# Patient Record
Sex: FEMALE | Race: WHITE | URBAN - METROPOLITAN AREA
[De-identification: names, ages, dates, MRNs, and addresses within clinical notes are randomized per-mention and may not be internally consistent; named-entity substitution may affect disease eponyms.]

---

## 2017-02-24 ENCOUNTER — TRANSFERRED RECORDS (OUTPATIENT)
Dept: HEALTH INFORMATION MANAGEMENT | Facility: CLINIC | Age: 57
End: 2017-02-24

## 2017-06-30 ENCOUNTER — TRANSFERRED RECORDS (OUTPATIENT)
Dept: HEALTH INFORMATION MANAGEMENT | Facility: CLINIC | Age: 57
End: 2017-06-30

## 2017-07-04 ENCOUNTER — HOSPITAL ENCOUNTER (EMERGENCY)
Facility: CLINIC | Age: 57
Discharge: HOME | End: 2017-07-04
Attending: FAMILY MEDICINE | Admitting: FAMILY MEDICINE
Payer: COMMERCIAL

## 2017-07-04 VITALS
OXYGEN SATURATION: 97 % | DIASTOLIC BLOOD PRESSURE: 55 MMHG | TEMPERATURE: 97.9 F | RESPIRATION RATE: 18 BRPM | SYSTOLIC BLOOD PRESSURE: 121 MMHG | WEIGHT: 230 LBS

## 2017-07-04 DIAGNOSIS — R10.13 EPIGASTRIC PAIN: ICD-10-CM

## 2017-07-04 DIAGNOSIS — E46 PROTEIN DEFICIENCY (H): ICD-10-CM

## 2017-07-04 DIAGNOSIS — R91.8 MASS OF LEFT LUNG: ICD-10-CM

## 2017-07-04 LAB
ALBUMIN SERPL-MCNC: 2.7 G/DL (ref 3.4–5)
ALBUMIN UR-MCNC: NEGATIVE MG/DL
ALP SERPL-CCNC: 104 U/L (ref 40–150)
ALT SERPL W P-5'-P-CCNC: 32 U/L (ref 0–50)
AMORPH CRY #/AREA URNS HPF: ABNORMAL /HPF
ANION GAP SERPL CALCULATED.3IONS-SCNC: 8 MMOL/L (ref 3–14)
APPEARANCE UR: ABNORMAL
AST SERPL W P-5'-P-CCNC: 23 U/L (ref 0–45)
BASOPHILS # BLD AUTO: 0 10E9/L (ref 0–0.2)
BASOPHILS NFR BLD AUTO: 0.4 %
BILIRUB SERPL-MCNC: 0.3 MG/DL (ref 0.2–1.3)
BILIRUB UR QL STRIP: NEGATIVE
BUN SERPL-MCNC: 23 MG/DL (ref 7–30)
CALCIUM SERPL-MCNC: 8.4 MG/DL (ref 8.5–10.1)
CAOX CRY #/AREA URNS HPF: ABNORMAL /HPF
CHLORIDE SERPL-SCNC: 110 MMOL/L (ref 94–109)
CO2 SERPL-SCNC: 26 MMOL/L (ref 20–32)
COLOR UR AUTO: YELLOW
CREAT SERPL-MCNC: 1.02 MG/DL (ref 0.52–1.04)
D DIMER PPP FEU-MCNC: 0.3 UG/ML FEU (ref 0–0.5)
DIFFERENTIAL METHOD BLD: NORMAL
EOSINOPHIL # BLD AUTO: 0.2 10E9/L (ref 0–0.7)
EOSINOPHIL NFR BLD AUTO: 2.1 %
ERYTHROCYTE [DISTWIDTH] IN BLOOD BY AUTOMATED COUNT: 14.3 % (ref 10–15)
GFR SERPL CREATININE-BSD FRML MDRD: 56 ML/MIN/1.7M2
GLUCOSE SERPL-MCNC: 133 MG/DL (ref 70–99)
GLUCOSE UR STRIP-MCNC: NEGATIVE MG/DL
HCT VFR BLD AUTO: 41.8 % (ref 35–47)
HGB BLD-MCNC: 13.4 G/DL (ref 11.7–15.7)
HGB UR QL STRIP: NEGATIVE
IMM GRANULOCYTES # BLD: 0.1 10E9/L (ref 0–0.4)
IMM GRANULOCYTES NFR BLD: 0.9 %
KETONES UR STRIP-MCNC: NEGATIVE MG/DL
LEUKOCYTE ESTERASE UR QL STRIP: NEGATIVE
LIPASE SERPL-CCNC: 295 U/L (ref 73–393)
LYMPHOCYTES # BLD AUTO: 1.9 10E9/L (ref 0.8–5.3)
LYMPHOCYTES NFR BLD AUTO: 18.1 %
MCH RBC QN AUTO: 27.6 PG (ref 26.5–33)
MCHC RBC AUTO-ENTMCNC: 32.1 G/DL (ref 31.5–36.5)
MCV RBC AUTO: 86 FL (ref 78–100)
MONOCYTES # BLD AUTO: 0.7 10E9/L (ref 0–1.3)
MONOCYTES NFR BLD AUTO: 6.9 %
MUCOUS THREADS #/AREA URNS LPF: PRESENT /LPF
NEUTROPHILS # BLD AUTO: 7.3 10E9/L (ref 1.6–8.3)
NEUTROPHILS NFR BLD AUTO: 71.6 %
NITRATE UR QL: NEGATIVE
PH UR STRIP: 5.5 PH (ref 5–7)
PLATELET # BLD AUTO: 251 10E9/L (ref 150–450)
POTASSIUM SERPL-SCNC: 3.7 MMOL/L (ref 3.4–5.3)
PROT SERPL-MCNC: 6.2 G/DL (ref 6.8–8.8)
RBC # BLD AUTO: 4.86 10E12/L (ref 3.8–5.2)
RBC #/AREA URNS AUTO: 27 /HPF (ref 0–2)
SODIUM SERPL-SCNC: 144 MMOL/L (ref 133–144)
SP GR UR STRIP: 1.02 (ref 1–1.03)
SQUAMOUS #/AREA URNS AUTO: 3 /HPF (ref 0–1)
TROPONIN I SERPL-MCNC: NORMAL UG/L (ref 0–0.04)
URN SPEC COLLECT METH UR: ABNORMAL
UROBILINOGEN UR STRIP-MCNC: NORMAL MG/DL (ref 0–2)
WBC # BLD AUTO: 10.2 10E9/L (ref 4–11)
WBC #/AREA URNS AUTO: 0 /HPF (ref 0–2)

## 2017-07-04 PROCEDURE — 80053 COMPREHEN METABOLIC PANEL: CPT | Performed by: FAMILY MEDICINE

## 2017-07-04 PROCEDURE — 25000131 ZZH RX MED GY IP 250 OP 636 PS 637: Performed by: FAMILY MEDICINE

## 2017-07-04 PROCEDURE — 25000132 ZZH RX MED GY IP 250 OP 250 PS 637: Performed by: FAMILY MEDICINE

## 2017-07-04 PROCEDURE — 99284 EMERGENCY DEPT VISIT MOD MDM: CPT

## 2017-07-04 PROCEDURE — 84484 ASSAY OF TROPONIN QUANT: CPT | Performed by: FAMILY MEDICINE

## 2017-07-04 PROCEDURE — 83690 ASSAY OF LIPASE: CPT | Performed by: FAMILY MEDICINE

## 2017-07-04 PROCEDURE — 85025 COMPLETE CBC W/AUTO DIFF WBC: CPT | Performed by: FAMILY MEDICINE

## 2017-07-04 PROCEDURE — 81001 URINALYSIS AUTO W/SCOPE: CPT | Performed by: FAMILY MEDICINE

## 2017-07-04 PROCEDURE — 99284 EMERGENCY DEPT VISIT MOD MDM: CPT | Performed by: FAMILY MEDICINE

## 2017-07-04 PROCEDURE — 36415 COLL VENOUS BLD VENIPUNCTURE: CPT

## 2017-07-04 PROCEDURE — 93005 ELECTROCARDIOGRAM TRACING: CPT

## 2017-07-04 PROCEDURE — 85379 FIBRIN DEGRADATION QUANT: CPT | Performed by: FAMILY MEDICINE

## 2017-07-04 RX ORDER — SUCRALFATE ORAL 1 G/10ML
1 SUSPENSION ORAL
Status: DISCONTINUED | OUTPATIENT
Start: 2017-07-04 | End: 2017-07-04 | Stop reason: HOSPADM

## 2017-07-04 RX ORDER — METOCLOPRAMIDE 10 MG/1
10 TABLET ORAL 4 TIMES DAILY PRN
Qty: 10 TABLET | Refills: 0 | Status: SHIPPED | OUTPATIENT
Start: 2017-07-04

## 2017-07-04 RX ORDER — METOCLOPRAMIDE 10 MG/1
10 TABLET ORAL
Status: DISCONTINUED | OUTPATIENT
Start: 2017-07-04 | End: 2017-07-04 | Stop reason: HOSPADM

## 2017-07-04 RX ADMIN — METOCLOPRAMIDE HYDROCHLORIDE 10 MG: 10 TABLET ORAL at 18:12

## 2017-07-04 RX ADMIN — SUCRALFATE 1 G: 1 SUSPENSION ORAL at 18:01

## 2017-07-04 ASSESSMENT — ENCOUNTER SYMPTOMS
BLOOD IN STOOL: 0
WHEEZING: 0
FEVER: 0
CONSTIPATION: 0
DYSURIA: 0
VOMITING: 0
NAUSEA: 0
DIAPHORESIS: 0
PALPITATIONS: 0
SORE THROAT: 0
HEADACHES: 0
ABDOMINAL PAIN: 1
CHILLS: 0
DIARRHEA: 0
SHORTNESS OF BREATH: 1
FREQUENCY: 0
COUGH: 1
SINUS PRESSURE: 0

## 2017-07-04 NOTE — ED NOTES
Pt has had a hiatal hernia for the last 3 years. States that she has had more pain lately and was seen at her PCP about 3 weeks ago for it and MD increased her protonix. Pain is in the left upper abdomen and wraps around to her back. Since supper last night she has been having more pain and her meds aren't working. Pt states that this is the same pain that she always has with her hernia. Has been worked up cardiac wise with this pain before.

## 2017-07-04 NOTE — ED PROVIDER NOTES
History     Chief Complaint   Patient presents with     Abdominal Pain     states she is having a hatal hernia attach and GI cocktail wont work needs morphine, this pain started last pm, has tried everything she could at home with no hlep      HPI  Sidra Morris is a 56 year old female who history hiatal hernia with intermittent GERD exacerbations, and - for the last 3 weeks has had increasing epigastric pain.    last evening following sausage - had LUQ pain and intermittently radiates around to the left upper back. Can also be on the right.    The patient denies nausea or vomiting, dysphagia, change in bowel habits or black or bloody stools or weight loss. Occult stool testing negative this year.  colonoscopy not yet done.  EGD done last 3 years ago and demonstrated only hiatal hernia. manometry and pH done. also had some stenosis findings.  longterm eructation and GERD.    Nausea today.  No vomiting.  s/p william 8 years ago.      traveling from [place] 3+ hours.  No other VTE risks including no estrogen    PMH  Congenital defect with absent RT uterus, ovary  history of ectopic  s/p hysterectomy   x2    Congenitally absent rt kidney    quit tobacco 1.5 years ago  COPD - emphysema    no alcohol  No drugs    No diabetes  hyperlipidemia on simvastatin      chest xray in the last week was normal - after cough with blood  CT chest at [facility] was negative - as far as she knows    I have reviewed the Medications, Allergies, Past Medical and Surgical History, and Social History in the Epic system.         Review of Systems   Constitutional: Negative for chills, diaphoresis and fever.   HENT: Negative for ear pain, sinus pressure and sore throat.    Eyes: Negative for visual disturbance.   Respiratory: Positive for cough and shortness of breath. Negative for wheezing.    Cardiovascular: Negative for chest pain and palpitations.   Gastrointestinal: Positive for abdominal pain. Negative for blood in stool, constipation,  diarrhea, nausea and vomiting.   Genitourinary: Negative for dysuria, frequency and urgency.   Skin: Negative for rash.   Neurological: Negative for headaches.   All other systems reviewed and are negative.      Physical Exam   BP: 117/72  Heart Rate: 78  Temp: 97.9  F (36.6  C)  Resp: 18  Weight: 104.3 kg (230 lb)  SpO2: 96 %  Physical Exam   Constitutional: No distress.   HENT:   Mouth/Throat: Oropharynx is clear and moist.   Eyes: Conjunctivae are normal.   Neck: Neck supple.   Cardiovascular: Normal rate and regular rhythm.  Exam reveals no gallop and no friction rub.    No murmur heard.  Pulmonary/Chest: Effort normal and breath sounds normal. No stridor. No respiratory distress. She has no wheezes. She has no rales.   Abdominal: Soft. She exhibits distension. There is no tenderness. There is no rebound and no guarding.   Musculoskeletal: She exhibits no edema.   Neurological: She is alert.   Skin: No rash noted. She is not diaphoretic.       ED Course     ED Course     Procedures            EKG: EKG done at 1805 hrs. demonstrates a sinus rhythm at 80 bpm with a normal axis and no ST change.  T-wave inversion in leads V1 and V2 with biphasic in V3 and flattening in V4 as well as in lead 3.  poor R progression V1 through V4 no Q waves.  Normal conduction.  No ectopy.  Normal intervals.  Impression sinus rhythm 80 bpm T-wave inversions and poor R progression in precordial leads.  No old EKG to compare.    Critical Care time:  none               Results for orders placed or performed during the hospital encounter of 07/04/17   CBC with platelets differential   Result Value Ref Range    WBC 10.2 4.0 - 11.0 10e9/L    RBC Count 4.86 3.8 - 5.2 10e12/L    Hemoglobin 13.4 11.7 - 15.7 g/dL    Hematocrit 41.8 35.0 - 47.0 %    MCV 86 78 - 100 fl    MCH 27.6 26.5 - 33.0 pg    MCHC 32.1 31.5 - 36.5 g/dL    RDW 14.3 10.0 - 15.0 %    Platelet Count 251 150 - 450 10e9/L    Diff Method Automated Method     % Neutrophils 71.6 %     % Lymphocytes 18.1 %    % Monocytes 6.9 %    % Eosinophils 2.1 %    % Basophils 0.4 %    % Immature Granulocytes 0.9 %    Absolute Neutrophil 7.3 1.6 - 8.3 10e9/L    Absolute Lymphocytes 1.9 0.8 - 5.3 10e9/L    Absolute Monocytes 0.7 0.0 - 1.3 10e9/L    Absolute Eosinophils 0.2 0.0 - 0.7 10e9/L    Absolute Basophils 0.0 0.0 - 0.2 10e9/L    Abs Immature Granulocytes 0.1 0 - 0.4 10e9/L   Comprehensive metabolic panel   Result Value Ref Range    Sodium 144 133 - 144 mmol/L    Potassium 3.7 3.4 - 5.3 mmol/L    Chloride 110 (H) 94 - 109 mmol/L    Carbon Dioxide 26 20 - 32 mmol/L    Anion Gap 8 3 - 14 mmol/L    Glucose 133 (H) 70 - 99 mg/dL    Urea Nitrogen 23 7 - 30 mg/dL    Creatinine 1.02 0.52 - 1.04 mg/dL    GFR Estimate 56 (L) >60 mL/min/1.7m2    GFR Estimate If Black 67 >60 mL/min/1.7m2    Calcium 8.4 (L) 8.5 - 10.1 mg/dL    Bilirubin Total 0.3 0.2 - 1.3 mg/dL    Albumin 2.7 (L) 3.4 - 5.0 g/dL    Protein Total 6.2 (L) 6.8 - 8.8 g/dL    Alkaline Phosphatase 104 40 - 150 U/L    ALT 32 0 - 50 U/L    AST 23 0 - 45 U/L   Lipase   Result Value Ref Range    Lipase 295 73 - 393 U/L   Troponin I   Result Value Ref Range    Troponin I ES  0.000 - 0.045 ug/L     <0.015  The 99th percentile for upper reference range is 0.045 ug/L.  Troponin values in   the range of 0.045 - 0.120 ug/L may be associated with risks of adverse   clinical events.     D dimer quantitative   Result Value Ref Range    D Dimer 0.3 0.0 - 0.50 ug/ml FEU   UA with Microscopic   Result Value Ref Range    Color Urine Yellow     Appearance Urine Slightly Cloudy     Glucose Urine Negative NEG mg/dL    Bilirubin Urine Negative NEG    Ketones Urine Negative NEG mg/dL    Specific Gravity Urine 1.025 1.003 - 1.035    Blood Urine Negative NEG    pH Urine 5.5 5.0 - 7.0 pH    Protein Albumin Urine Negative NEG mg/dL    Urobilinogen mg/dL Normal 0.0 - 2.0 mg/dL    Nitrite Urine Negative NEG    Leukocyte Esterase Urine Negative NEG    Source Midstream Urine     WBC  "Urine 0 0 - 2 /HPF    RBC Urine 27 (H) 0 - 2 /HPF    Squamous Epithelial /HPF Urine 3 (H) 0 - 1 /HPF    Mucous Urine Present (A) NEG /LPF    Calcium Oxalate Few (A) NEG /HPF    Amorphous Crystals Few (A) NEG /HPF         Assessments & Plan (with Medical Decision Making)     MDM: Sidra Morris is a 56 year old female Who presented with a history of gastrointestinal symptoms and multiple G.I. workups over time including [facility] with upper endoscopy, manometry.  She was here for a dyspepsia type of sensation, Triggered by which he attributed as dietary indiscretions the night before and symptoms it started soon after that. but that was radiating into the left upper quadrant.  Refractory to her antacids - and even exacerbated by antacids.  She noted should been treated with opioids for this type of G.I. pain and refractory to other measures.  She noted it was similar to prior episodes. In the emergency department  she did have relief with Reglan and we discussed its use outpatient on a PRN basis.    However we also consider differential diagnosis this was in left upper quadrant and could reflect left lower chest symptoms.  We had no history from the patient who is traveling from [place], And it also had an evaluation at [facility] in the last week for one episode of hemoptysis with the CT of the chest that had been obtained but results were not available to her. She had not heard from them so is assuming that this was normal.  [facility]\" did not have her facilities from [place] but I was able to request records from [facility].  These did not arrive until after her discharge but before she left her exam room And are discussed below.   As she just had a chest x-ray and CT of the chest in the last week she did not want to pursue any chest imaging but was agreeable to troponin d-dimer EKG and lab work related to her gastrointestinal symptoms.  These findings were reassuring - although she had findings on EKG " that might have been prior anterior MI - no acute changes were present.  She did curiously have a low albumin and protein and we discussed this as possible malnutrition - but is more likely related to CT findings as below.    As noted, as she was being readied for discharge, [facility] CT results arrived.  The CT chest demonstrates a 5 cm mass with hilar adenopathy and highly suggestive of malignancy.  It is unclear if this is causing her current symptoms - This could be a referred pain to LU.  she currently has no shortness of breath or chest pain, cough. normal vitals and not hypoxic.  I expressed to Yelena that I was suspicious that these findings were consistent with cancer and this needed to be biopsied and she would need to call [facility] in the morning and speak with her pulmonology group..  She is also given precautions for return for pulmonary symptoms.          I have reviewed the nursing notes.    I have reviewed the findings, diagnosis, plan and need for follow up with the patient.       New Prescriptions    No medications on file       Final diagnoses:   Epigastric pain - Unclear cause.  possible causes include gastritis, peptic ulcers, hiatal hernia/GERD, dyspepsia, delayed gastric emptying, constipation.  Trial on reglan as needed - keep benadryl around with this med in case of reaction.  Stay on prilosec.  Consider B12 test (although MCV normal).  consider bowel regimen - starting wit fleets enema x1, followed by mag citrate x1 bottle, followed by miralax 1/2 to 1 capful for 1 week, followed by citrucel or other fibver.   Protein deficiency (H) - protein and albumin low. ?malnourishment   Mass of left lung - There is a mass in the left lung 5 cm that was seen on Thursday's CT at [facility]. This is suspicious for cancer.  This was reviewed at the end of the visit. The chest xray was not repeated today given the recent CT, but if shortness of breath or chest pain occur, return for repeat chest xray/  This may  not be related to today's symptoms.       7/4/2017   [facility] EMERGENCY DEPARTMENT     José Miguel Jensen MD  07/05/17 0927

## 2017-07-04 NOTE — ED AVS SNAPSHOT
[facility] Emergency Department    [address] [facility] [address]    Phone:  [phone]    Fax:  [phone]                                       Sidra Morris   MRN: [id number]    Department:  [facility] Emergency Department   Date of Visit:  7/4/2017           Patient Information     Date Of Birth          1960        Your diagnoses for this visit were:     Epigastric pain Unclear cause.  possible causes include gastritis, peptic ulcers, hiatal hernia/GERD, dyspepsia, delayed gastric emptying, constipation.  Trial on reglan as needed - keep benadryl around with this med in case of reaction.  Stay on prilosec.  Consider B12 test (although MCV normal).  consider bowel regimen - starting wit fleets enema x1, followed by mag citrate x1 bottle, followed by miralax 1/2 to 1 capful for 1 week, followed by citrucel or other fibver.    Protein deficiency (H) protein and albumin low. ?malnourishment    Mass of left lung There is a mass in the left lung 5 cm that was seen on Thursday's CT at [place]. This is suspicious for cancer.  This was reviewed at the end of the visit. The chest xray was not repeated today given the recent CT, but if shortness of breath or chest pain occur, return for repeat chest xray/  This may not be related to today's symptoms.       You were seen by José Miguel Jensen MD.      Follow-up Information     Follow up with Manjula Benito In 1 week.    Contact information:    [id number]  [address] [place] [address]          Follow up with [facility] Emergency Department.    Specialty:  EMERGENCY MEDICINE    Why:  As needed, If symptoms worsen    Contact information:    [address] [place] [id number] [phone]    Additional information:    The medical center is located at   [address] (between [address] and   [address] in [place], [place] north   of [place]).        Discharge Instructions         ICD-10-CM    1.  Epigastric pain R10.13     Unclear cause.  possible causes include gastritis, peptic ulcers, hiatal hernia/GERD, dyspepsia, delayed gastric emptying, constipation.  Trial on reglan as needed - keep benadryl around with this med in case of reaction.  Stay on prilosec.  Consider B12 test (although MCV normal).  consider bowel regimen - starting wit fleets enema x1, followed by mag citrate x1 bottle, followed by miralax 1/2 to 1 capful for 1 week, followed by citrucel or other fibver.   2. Protein deficiency (H) E46     protein and albumin low. ?malnourishment   3. Mass of left lung R91.8     There is a mass in the left lung 5 cm that was seen on Thursday's CT at [place]. This is suspicious for cancer.  This was reviewed at the end of the visit. The chest xray was not repeated today given the recent CT, but if shortness of breath or chest pain occur, return for repeat chest xray/  This may not be related to today's symptoms.         Epigastric Pain (Uncertain Cause)    Epigastric pain can be a sign of disease in the upper abdomen. Common causes include:    Acid reflux (stomach acid flowing up into the esophagus)    Gastritis (irritation of the stomach lining)    Peptic Ulcer Disease    Inflammation of the pancreas    Gallstone    Infection in the gallbladder  Pain may be dull or burning. It may spread upward to the chest or to the back. There may be other symptoms such as belching, bloating, cramps or hunger pains. There may be weight loss or poor appetite, nausea or vomiting.  Since the diagnosis of your pain is not certain yet, further tests may sometimes be needed. Sometimes the doctor will treat you for the most likely condition to see if there is improvement before doing further tests.  Home care  Medicines    Antacids help neutralize the normal acids in your stomach. Examples are Maalox, Mylanta, Rolaids, and Tums. If you don t like the liquid, you can also try a chewable one. You may find one works better than  another for you. Overuse can cause diarrhea or constipation.    Acid blockers (H2 blockers) decrease acid production. Examples are cimetidine (Tagamet), famotidine (Pepcid) and ranitidine (Zantac).    Acid inhibitors (PPIs) decrease acid production in a different way than the blockers. You may find they work better, but can take a little longer to take effect.  Examples are omeprazole (Prilosec), lansoprazole (Prevacid), pantoprazole (Protonix), rabeprazole (Aciphex), and esomeprazole (Nexium).    Take an antacid 30-60 minutes after eating and at bedtime, but not at the same time as an acid blocker.    Try not to take NSAIDs. Aspirin may also cause problems, but if taking it for your heart or other medical reasons, talk to your doctor before stopping it; you do not want to cause a worse problem, like a heart attack or stroke.  Diet    If certain foods seem to cause your spasm, try to avoid them.     Eat slowly and chew food well before swallowing. Symptoms of gastritis can be worsened by certain foods. Limit or avoid fatty, fried, and spicy foods, as well as coffee, chocolate, mint, and foods with high acid content such as tomatoes and citrus fruit and juices (orange, grapefruit, lemon).    Avoid alcohol, caffeine, and tobacco, which can delay healing and worsen your problem.    Try eating smaller meals with snacks in between  Follow-up care  Follow up with your healthcare provider or as advised.  When to seek medical advice  Call your healthcare provider right away if any of the following occur:    Stomach pain worsens or moves to the right lower part of the abdomen    Chest pain appears, or if it worsens or spreads to the chest, back, neck, shoulder, or arm    Frequent vomiting (can t keep down liquids)    Blood in the stool or vomit (red or black color)    Feeling weak or dizzy, fainting, or having trouble breathing    Fever of 100.4 F (38 C) or higher, or as directed by your healthcare provider    Abdominal  swelling  Date Last Reviewed: 9/25/2015 2000-2017 [employer]. [address] [place] [address], [place] [address]. All rights reserved. This information is not intended as a substitute for professional medical care. Always follow your healthcare professional's instructions.          24 Hour Appointment Hotline       To make an appointment at any [facility], call [phone] ([phone]). If you don't have a family doctor or clinic, we will help you find one. [facility] are conveniently located to serve the needs of you and your family.             Review of your medicines      START taking        Dose / Directions Last dose taken    metoclopramide 10 MG tablet   Commonly known as:  REGLAN   Dose:  10 mg   Quantity:  10 tablet        Take 1 tablet (10 mg) by mouth 4 times daily as needed   Refills:  0          Our records show that you are taking the medicines listed below. If these are incorrect, please call your family doctor or clinic.        Dose / Directions Last dose taken    PANTOPRAZOLE SODIUM PO        Take by mouth 2 times daily (before meals)   Refills:  0        SIMVASTATIN PO        Take by mouth daily   Refills:  0        VENLAFAXINE HCL ER PO        Take by mouth daily   Refills:  0        ZANTAC PO        Take by mouth daily   Refills:  0                Prescriptions were sent or printed at these locations (1 Prescription)                   [facility] [address] [address]    Telephone:  [phone]   Fax:  [phone]   Hours:                  E-Prescribed (1 of 1)         metoclopramide (REGLAN) 10 MG tablet                Procedures and tests performed during your visit     CBC with platelets differential    Comprehensive metabolic panel    D dimer quantitative    EKG 12-lead, tracing only    Lipase    Troponin I    UA with Microscopic      Orders Needing Specimen Collection     None      Pending Results      No orders found from 7/2/2017 to 7/5/2017.            Pending Culture Results     No orders found from 7/2/2017 to 7/5/2017.            Pending Results Instructions     If you had any lab results that were not finalized at the time of your Discharge, you can call the ED Lab Result RN at [phone]. You will be contacted by this team for any positive Lab results or changes in treatment. The nurses are available 7 days a week from 10A to 6:30P.  You can leave a message 24 hours per day and they will return your call.        Test Results From Your Hospital Stay        7/4/2017  6:53 PM      Component Results     Component Value Ref Range & Units Status    WBC 10.2 4.0 - 11.0 10e9/L Final    RBC Count 4.86 3.8 - 5.2 10e12/L Final    Hemoglobin 13.4 11.7 - 15.7 g/dL Final    Hematocrit 41.8 35.0 - 47.0 % Final    MCV 86 78 - 100 fl Final    MCH 27.6 26.5 - 33.0 pg Final    MCHC 32.1 31.5 - 36.5 g/dL Final    RDW 14.3 10.0 - 15.0 % Final    Platelet Count 251 150 - 450 10e9/L Final    Diff Method Automated Method  Final    % Neutrophils 71.6 % Final    % Lymphocytes 18.1 % Final    % Monocytes 6.9 % Final    % Eosinophils 2.1 % Final    % Basophils 0.4 % Final    % Immature Granulocytes 0.9 % Final    Absolute Neutrophil 7.3 1.6 - 8.3 10e9/L Final    Absolute Lymphocytes 1.9 0.8 - 5.3 10e9/L Final    Absolute Monocytes 0.7 0.0 - 1.3 10e9/L Final    Absolute Eosinophils 0.2 0.0 - 0.7 10e9/L Final    Absolute Basophils 0.0 0.0 - 0.2 10e9/L Final    Abs Immature Granulocytes 0.1 0 - 0.4 10e9/L Final         7/4/2017  7:09 PM      Component Results     Component Value Ref Range & Units Status    Sodium 144 133 - 144 mmol/L Final    Potassium 3.7 3.4 - 5.3 mmol/L Final    Chloride 110 (H) 94 - 109 mmol/L Final    Carbon Dioxide 26 20 - 32 mmol/L Final    Anion Gap 8 3 - 14 mmol/L Final    Glucose 133 (H) 70 - 99 mg/dL Final    Urea Nitrogen 23 7 - 30 mg/dL Final    Creatinine 1.02 0.52 - 1.04 mg/dL Final    GFR Estimate  56 (L) >60 mL/min/1.7m2 Final    Non  GFR Calc    GFR Estimate If Black 67 >60 mL/min/1.7m2 Final    African American GFR Calc    Calcium 8.4 (L) 8.5 - 10.1 mg/dL Final    Bilirubin Total 0.3 0.2 - 1.3 mg/dL Final    Albumin 2.7 (L) 3.4 - 5.0 g/dL Final    Protein Total 6.2 (L) 6.8 - 8.8 g/dL Final    Alkaline Phosphatase 104 40 - 150 U/L Final    ALT 32 0 - 50 U/L Final    AST 23 0 - 45 U/L Final         7/4/2017  7:09 PM      Component Results     Component Value Ref Range & Units Status    Lipase 295 73 - 393 U/L Final         7/4/2017  7:09 PM      Component Results     Component Value Ref Range & Units Status    Troponin I ES  0.000 - 0.045 ug/L Final    <0.015  The 99th percentile for upper reference range is 0.045 ug/L.  Troponin values in   the range of 0.045 - 0.120 ug/L may be associated with risks of adverse   clinical events.           7/4/2017  7:00 PM      Component Results     Component Value Ref Range & Units Status    D Dimer 0.3 0.0 - 0.50 ug/ml FEU Final    This D-dimer assay is intended for use in conjuntion with a clinical pretest   probability assessment model to exclude pulmonary embolism (PE) and as an aid   in the diagnosis of deep venous thrombosis (DVT) in outpatients suspected of   PE   or DVT. The cut-off value is 0.5 g/mL FEU.           7/4/2017  7:20 PM      Component Results     Component Value Ref Range & Units Status    Color Urine Yellow  Final    Appearance Urine Slightly Cloudy  Final    Glucose Urine Negative NEG mg/dL Final    Bilirubin Urine Negative NEG Final    Ketones Urine Negative NEG mg/dL Final    Specific Gravity Urine 1.025 1.003 - 1.035 Final    Blood Urine Negative NEG Final    pH Urine 5.5 5.0 - 7.0 pH Final    Protein Albumin Urine Negative NEG mg/dL Final    Urobilinogen mg/dL Normal 0.0 - 2.0 mg/dL Final    Nitrite Urine Negative NEG Final    Leukocyte Esterase Urine Negative NEG Final    Source Midstream Urine  Final    WBC Urine 0 0 - 2 /HPF  "Final    RBC Urine 27 (H) 0 - 2 /HPF Final    Squamous Epithelial /HPF Urine 3 (H) 0 - 1 /HPF Final    Mucous Urine Present (A) NEG /LPF Final    Calcium Oxalate Few (A) NEG /HPF Final    Amorphous Crystals Few (A) NEG /HPF Final                Thank you for choosing [facility]       Thank you for choosing [facility] for your care. Our goal is always to provide you with excellent care. Hearing back from our patients is one way we can continue to improve our services. Please take a few minutes to complete the written survey that you may receive in the mail after you visit with us. Thank you!        [employer] Information     [employer] lets you send messages to your doctor, view your test results, renew your prescriptions, schedule appointments and more. To sign up, go to [email or web address] . Click on \"Log in\" on the left side of the screen, which will take you to the Welcome page. Then click on \"Sign up Now\" on the right side of the page.     You will be asked to enter the access code listed below, as well as some personal information. Please follow the directions to create your username and password.     Your access code is: [email or web address]  Expires: 10/2/2017  7:36 PM     Your access code will  in 90 days. If you need help or a new code, please call your [facility] clinic or [phone].        Care EveryWhere ID     This is your Care EveryWhere ID. This could be used by other organizations to access your [facility] medical records  [id number]        Equal Access to Services     KULDEEP YU : Hadii sandeep ku hadasho Soomaali, waaxda luqadaha, qaybta kaalmada adeegyada, waxay dagoberto villavicencio . So [facility] [phone].    ATENCIÓN: Si habla español, tiene a antunez disposición servicios gratuitos de asistencia lingüística. Llame al [phone].    We comply with applicable federal civil rights laws and [place] laws. We do not discriminate on the basis of race, color, national origin, age, disability sex, " sexual orientation or gender identity.            After Visit Summary       This is your record. Keep this with you and show to your community pharmacist(s) and doctor(s) at your next visit.

## 2017-07-04 NOTE — ED AVS SNAPSHOT
[facility] Emergency Department    [address] [address]    Phone:  [phone]    Fax:  [phone]                                       Sidra Morris   MRN: [id number]    Department:  [facility] Emergency Department   Date of Visit:  7/4/2017           After Visit Summary Signature Page     I have received my discharge instructions, and my questions have been answered. I have discussed any challenges I see with this plan with the nurse or doctor.    ..........................................................................................................................................  Patient/Patient Representative Signature      ..........................................................................................................................................  Patient Representative Print Name and Relationship to Patient    ..................................................               ................................................  Date                                            Time    ..........................................................................................................................................  Reviewed by Signature/Title    ...................................................              ..............................................  Date                                                            Time

## 2017-07-05 NOTE — DISCHARGE INSTRUCTIONS
ICD-10-CM    1. Epigastric pain R10.13     Unclear cause.  possible causes include gastritis, peptic ulcers, hiatal hernia/GERD, dyspepsia, delayed gastric emptying, constipation.  Trial on reglan as needed - keep benadryl around with this med in case of reaction.  Stay on prilosec.  Consider B12 test (although MCV normal).  consider bowel regimen - starting wit fleets enema x1, followed by mag citrate x1 bottle, followed by miralax 1/2 to 1 capful for 1 week, followed by citrucel or other fibver.   2. Protein deficiency (H) E46     protein and albumin low. ?malnourishment   3. Mass of left lung R91.8     There is a mass in the left lung 5 cm that was seen on Thursday's CT at [place]. This is suspicious for cancer.  This was reviewed at the end of the visit. The chest xray was not repeated today given the recent CT, but if shortness of breath or chest pain occur, return for repeat chest xray/  This may not be related to today's symptoms.         Epigastric Pain (Uncertain Cause)    Epigastric pain can be a sign of disease in the upper abdomen. Common causes include:    Acid reflux (stomach acid flowing up into the esophagus)    Gastritis (irritation of the stomach lining)    Peptic Ulcer Disease    Inflammation of the pancreas    Gallstone    Infection in the gallbladder  Pain may be dull or burning. It may spread upward to the chest or to the back. There may be other symptoms such as belching, bloating, cramps or hunger pains. There may be weight loss or poor appetite, nausea or vomiting.  Since the diagnosis of your pain is not certain yet, further tests may sometimes be needed. Sometimes the doctor will treat you for the most likely condition to see if there is improvement before doing further tests.  Home care  Medicines    Antacids help neutralize the normal acids in your stomach. Examples are Maalox, Mylanta, Rolaids, and Tums. If you don t like the liquid, you can also try a chewable one. You may find one  works better than another for you. Overuse can cause diarrhea or constipation.    Acid blockers (H2 blockers) decrease acid production. Examples are cimetidine (Tagamet), famotidine (Pepcid) and ranitidine (Zantac).    Acid inhibitors (PPIs) decrease acid production in a different way than the blockers. You may find they work better, but can take a little longer to take effect.  Examples are omeprazole (Prilosec), lansoprazole (Prevacid), pantoprazole (Protonix), rabeprazole (Aciphex), and esomeprazole (Nexium).    Take an antacid 30-60 minutes after eating and at bedtime, but not at the same time as an acid blocker.    Try not to take NSAIDs. Aspirin may also cause problems, but if taking it for your heart or other medical reasons, talk to your doctor before stopping it; you do not want to cause a worse problem, like a heart attack or stroke.  Diet    If certain foods seem to cause your spasm, try to avoid them.     Eat slowly and chew food well before swallowing. Symptoms of gastritis can be worsened by certain foods. Limit or avoid fatty, fried, and spicy foods, as well as coffee, chocolate, mint, and foods with high acid content such as tomatoes and citrus fruit and juices (orange, grapefruit, lemon).    Avoid alcohol, caffeine, and tobacco, which can delay healing and worsen your problem.    Try eating smaller meals with snacks in between  Follow-up care  Follow up with your healthcare provider or as advised.  When to seek medical advice  Call your healthcare provider right away if any of the following occur:    Stomach pain worsens or moves to the right lower part of the abdomen    Chest pain appears, or if it worsens or spreads to the chest, back, neck, shoulder, or arm    Frequent vomiting (can t keep down liquids)    Blood in the stool or vomit (red or black color)    Feeling weak or dizzy, fainting, or having trouble breathing    Fever of 100.4 F (38 C) or higher, or as directed by your healthcare  provider    Abdominal swelling  Date Last Reviewed: 9/25/2015 2000-2017 [employer]. [address] [place] [address], [place] [address]. All rights reserved. This information is not intended as a substitute for professional medical care. Always follow your healthcare professional's instructions.